# Patient Record
Sex: FEMALE | Race: AMERICAN INDIAN OR ALASKA NATIVE
[De-identification: names, ages, dates, MRNs, and addresses within clinical notes are randomized per-mention and may not be internally consistent; named-entity substitution may affect disease eponyms.]

---

## 2020-02-29 ENCOUNTER — HOSPITAL ENCOUNTER (EMERGENCY)
Dept: HOSPITAL 46 - ED | Age: 50
Discharge: HOME | End: 2020-02-29
Payer: COMMERCIAL

## 2020-02-29 VITALS — WEIGHT: 214.99 LBS | BODY MASS INDEX: 35.82 KG/M2 | HEIGHT: 65 IN

## 2020-02-29 DIAGNOSIS — R07.9: Primary | ICD-10-CM

## 2020-02-29 DIAGNOSIS — E11.9: ICD-10-CM

## 2020-02-29 DIAGNOSIS — Z79.84: ICD-10-CM

## 2020-02-29 NOTE — EKG
[facility]
                                    [address] [facility]
                                  [place]  [address]
_________________________________________________________________________________________
                                                                 Signed   
 
 
Normal sinus rhythm
Normal ECG
No previous ECGs available
Confirmed by YOGI CASTORENA MD ([id number]) on 2/29/2020 4:37:49 PM
 
 
 
 
 
 
 
 
 
 
 
 
 
 
 
 
 
 
 
 
 
 
 
 
 
 
 
 
 
 
 
 
 
 
 
 
 
 
 
 
 
    Electronically Signed By: YOGI CASTORENA MD  02/29/20 1638
_________________________________________________________________________________________
PATIENT NAME:     TIMUR SCHWARTZ                
MEDICAL RECORD #: [id number]                     Electrocardiogram             
          ACCT #: [id number]  
DATE OF BIRTH:   [birth year]                                       
PHYSICIAN:   YOGI CASTORENA MD           REPORT #: [id number]
REPORT IS CONFIDENTIAL AND NOT TO BE RELEASED WITHOUT AUTHORIZATION

## 2021-03-05 ENCOUNTER — HOSPITAL ENCOUNTER (EMERGENCY)
Dept: HOSPITAL 46 - ED | Age: 51
Discharge: HOME | End: 2021-03-05
Payer: COMMERCIAL

## 2021-03-05 VITALS — WEIGHT: 215 LBS | BODY MASS INDEX: 35.82 KG/M2 | HEIGHT: 65 IN

## 2021-03-05 DIAGNOSIS — E11.9: ICD-10-CM

## 2021-03-05 DIAGNOSIS — N93.9: Primary | ICD-10-CM

## 2021-03-05 DIAGNOSIS — Z79.4: ICD-10-CM

## 2021-03-05 DIAGNOSIS — Z79.899: ICD-10-CM

## 2023-07-24 ENCOUNTER — HOSPITAL ENCOUNTER (OUTPATIENT)
Dept: HOSPITAL 46 - OPS | Age: 53
Discharge: HOME | End: 2023-07-24
Attending: SURGERY
Payer: COMMERCIAL

## 2023-07-24 VITALS — SYSTOLIC BLOOD PRESSURE: 136 MMHG | DIASTOLIC BLOOD PRESSURE: 79 MMHG

## 2023-07-24 VITALS — DIASTOLIC BLOOD PRESSURE: 70 MMHG | SYSTOLIC BLOOD PRESSURE: 138 MMHG

## 2023-07-24 DIAGNOSIS — Z12.11: Primary | ICD-10-CM

## 2023-07-24 DIAGNOSIS — K64.8: ICD-10-CM

## 2023-07-24 DIAGNOSIS — Z90.49: ICD-10-CM

## 2023-07-24 DIAGNOSIS — Z86.010: ICD-10-CM

## 2023-07-24 DIAGNOSIS — E11.9: ICD-10-CM

## 2023-07-24 DIAGNOSIS — E66.9: ICD-10-CM

## 2023-07-24 DIAGNOSIS — Z79.4: ICD-10-CM

## 2023-07-24 DIAGNOSIS — K63.5: ICD-10-CM

## 2023-07-24 DIAGNOSIS — Z79.84: ICD-10-CM

## 2023-07-24 PROCEDURE — 0DBN8ZX EXCISION OF SIGMOID COLON, VIA NATURAL OR ARTIFICIAL OPENING ENDOSCOPIC, DIAGNOSTIC: ICD-10-PCS | Performed by: SURGERY

## 2023-07-24 PROCEDURE — G0500 MOD SEDAT ENDO SERVICE >5YRS: HCPCS

## 2023-07-24 NOTE — OR
[facility]
                                    [address] [place], [place]  [address]
_________________________________________________________________________________________
                                                                 Draft    
 
 
DATE OF OPERATION:
07/24/2023
 
SURGEON:
Roxanne Gonzales MD
 
PREOPERATIVE DIAGNOSIS:
Colon screening, episodic rectal bleeding.
 
POSTOPERATIVE DIAGNOSIS:
Two small polyps of the rectosigmoid (excised) and internal hemorrhoids.
 
PROCEDURE:
Total colonoscopy to the cecum with cold morcellation polypectomy x2.
 
ANESTHESIA:
Intravenous sedation; fentanyl 100 mcg and Versed 5 mg.
 
INDICATION:
This 53-year-old woman is a patient of Dr. Barrow at [facility] and referred for
colon screening.  She has episodic rectal bleeding, none in recent times.  She has had
colonoscopy in 2000 and has no family history of colon cancer that she is aware of.  She
understands the risk of bleeding, infection and perforation related to colonoscopy and
wished to proceed. 
 
FINDINGS:
The prep was excellent.  Complete colonoscopy was undertaken to the cecum without
question.  She had two very small polyps of the rectosigmoid, both excised.  Retroflexed
view confirmed internal hemorrhoidal changes.  She had no sign of active bleeding at
this time. 
 
DESCRIPTION OF PROCEDURE:
The patient was brought to the endoscopy suite and placed in the lateral decubitus
position, given intravenous sedation to the point of slurred speech and nystagmus.
Digital rectal examination was normal. 
 
An Olympus video colonoscope was passed in the rectum and manipulated throughout the
colon ultimately intubating the cecum itself.  The ileocecal valve and the appendiceal
orifice were normal.  The scope was withdrawn from that point and examination throughout
undertaken ultimately showing two very small polyps in the rectosigmoid, both were
excised with cold morcellation technique.  Retroflexed view was undertaken showing
internal hemorrhoidal change.  The hemorrhoids were not bleeding.  The scope was
 
                                                                                    
_________________________________________________________________________________________
PATIENT NAME:     TIMUR SCHWARTZ                
MEDICAL RECORD #: [id number]            OPERATIVE REPORT              
          ACCT #: [id number]  
DATE OF BIRTH:   [birth year]            REPORT #: [id number]      
PHYSICIAN:        ROXANNE GONZALES MD                 
PCP:              AVA BARROW MD                
REPORT IS CONFIDENTIAL AND NOT TO BE RELEASED WITHOUT AUTHORIZATION
 
 
                                  [facility]
                                    [address] [place], [place]  [address]
_________________________________________________________________________________________
                                                                 Draft    
 
 
withdrawn and removed. The patient was taken to the recovery room in good condition. 
 
CONCLUDING DIAGNOSES:
1. Two small polyps of the rectosigmoid (excised).
2. Internal hemorrhoids.
 
PLAN:
Recommend repeat colonoscopy in 3 to 5 years, sooner if clinically indicated.  Would
recommend high-fiber diet.  If recurrent rectal bleeding should recur, consideration for
internal hemorrhoidal banding would be undertaken.  She will return to the ongoing care
of Dr. Barrow at [facility]. 
 
 
 
            ________________________________________
            MD PATRICIO Omalley/MODL
Job #:  [id number]
DD:  07/24/2023 08:10:53
DT:  07/24/2023 09:00:58
 
cc:            Ava Barrow MD
 
 
Copies:  AVA BARROW MD
~
 
 
 
 
 
 
 
 
 
 
 
 
 
 
 
                                                                                    
_________________________________________________________________________________________
PATIENT NAME:     TIMUR SCHWARTZ                
MEDICAL RECORD #: [id number]            OPERATIVE REPORT              
          ACCT #: [id number]  
DATE OF BIRTH:   [birth year]            REPORT #: [id number]      
PHYSICIAN:        ROAXNNE GONZAELS MD                 
PCP:              AAV BARROW MD                
REPORT IS CONFIDENTIAL AND NOT TO BE RELEASED WITHOUT AUTHORIZATION

## 2023-07-24 NOTE — NUR
07/24/23 0806 Shakira Cotter
0802-PT TO PACU IN LL POSITION. EYES CLOSED. PT RESPONDS TO VERBAL AND
TACTILE STIMULI. DENIES PAIN AND FALLS QUICKLY BACK TO SLEEP WITHOUT
RN STIMULI. BREATHING EASY AND UNLABORED. SPO2 >90% ON ROOM AIR. PT
EDUCATED TO POC IN PACU AND ENCOURAGED TO PASS GAS.

## 2023-07-26 NOTE — PATH
[facility]
                                    [address] [place], [place]  [address]
_________________________________________________________________________________________
                                                                 Signed   
 
 
 
SPECIMEN(S): A RECTOSIGMOID POLYP
 
SPECIMEN SOURCE:
A. RECTOSIGMOID POLYP
 
CLINICAL HISTORY:
Colonoscopy
 
FINAL PATHOLOGIC DIAGNOSIS:
Rectosigmoid polyp, polypectomy:
-  Hyperplastic polyp.
-  Negative for dysplasia.
NA:cml:C2NR
 
MICROSCOPIC EXAMINATION:
Histologic sections of all submitted blocks are examined by light microscopy.  
These findings, together with the gross examination, support the pathologic 
diagnosis. 
 
GROSS DESCRIPTION:
The specimen, labeled and designated "LIZBET Tolliver," and designated on the 
requisition "colon, rectosigmoid polyp," is received in formalin and consists 
of two tan soft polypoid tissue fragments 
measuring 0.4 to 0.5 cm in length and up to 0.2 cm in diameter. The specimens 
are submitted entirely in (A1). 
MMA (under the direct supervision of a pathologist)
The Gross Description was prepared using a voice recognition system. The report 
was reviewed for accuracy; however, sound-alike word errors, addition and/or 
deletions may occur. If there is any 
question about this report, please contact Client Services.
 
PERFORMING LABORATORY:
Technical component was performed by [facility], [address] [place] [id number] (CLIA# [id number]).  Professional interpretation was 
performed by [facility], [address] [place] [address] (CLIA# [id number]).
 
Diagnostician:  Tiffanie Abel MD
Pathologist
Electronically Signed 07/26/2023
 
 
                                                                                    
_________________________________________________________________________________________
PATIENT NAME:     TIMUR TOLLIVER                
MEDICAL RECORD #: [id number]            PATHOLOGY                     
          ACCT #: [id number]       ACCESSION #: [id number]     
DATE OF BIRTH:   [birth year]            REPORT #: [id number]       
PHYSICIAN:        [facility]              
PCP:              AVA PEACOCK MD                
REPORT IS CONFIDENTIAL AND NOT TO BE RELEASED WITHOUT AUTHORIZATION
 
 
                                  [address]
                                  [place], [place]  [address]
_________________________________________________________________________________________
                                                                 Signed   
 
 
Copies:                                
~
 
 
 
 
 
 
 
 
 
 
 
 
 
 
 
 
 
 
 
 
 
 
 
 
 
 
 
 
 
 
 
 
 
 
 
 
 
 
 
 
 
                                                                                    
_________________________________________________________________________________________
PATIENT NAME:     TIMUR TOLLIVER                
MEDICAL RECORD #: [id number]            PATHOLOGY                     
          ACCT #: [id number]       ACCESSION #: [id number]     
DATE OF BIRTH:   [birth year]            REPORT #: [id number]       
PHYSICIAN:        [facility]              
PCP:              AVA PEACOCK MD                
REPORT IS CONFIDENTIAL AND NOT TO BE RELEASED WITHOUT AUTHORIZATION

## 2024-09-08 ENCOUNTER — HOSPITAL ENCOUNTER (EMERGENCY)
Dept: HOSPITAL 46 - ED | Age: 54
Discharge: HOME | End: 2024-09-08
Payer: COMMERCIAL

## 2024-09-08 VITALS — WEIGHT: 201.28 LBS | HEIGHT: 65 IN | BODY MASS INDEX: 33.54 KG/M2

## 2024-09-08 VITALS — DIASTOLIC BLOOD PRESSURE: 81 MMHG | SYSTOLIC BLOOD PRESSURE: 120 MMHG

## 2024-09-08 DIAGNOSIS — Z79.84: ICD-10-CM

## 2024-09-08 DIAGNOSIS — E11.9: ICD-10-CM

## 2024-09-08 DIAGNOSIS — R21: Primary | ICD-10-CM

## 2024-09-08 DIAGNOSIS — Z79.85: ICD-10-CM

## 2024-09-08 DIAGNOSIS — L29.9: ICD-10-CM

## 2024-09-08 LAB
ALBUMIN SERPL-MCNC: 3.6 G/DL (ref 3.4–5)
ALBUMIN/GLOB SERPL: 1.06 {RATIO} (ref 1.1–2.4)
ALP SERPL-CCNC: 108 U/L (ref 46–116)
ALT SERPL W P-5'-P-CCNC: 41 U/L (ref 14–59)
ANION GAP SERPL CALCULATED.4IONS-SCNC: 14.5 MMOL/L (ref 7–21)
AST SERPL-CCNC: 36 U/L (ref 15–37)
BASOPHILS NFR BLD AUTO: 0.6 % (ref 0–2)
BUN SERPL-MCNC: 21 MG/DL (ref 7–18)
BUN/CREAT SERPL: 20.58 (ref 6–28.6)
CALCIUM SERPL-MCNC: 9.4 MG/DL (ref 8.5–10.1)
CHLORIDE SERPL-SCNC: 107 MMOL/L (ref 98–107)
CO2 SERPL-SCNC: 25 MMOL/L (ref 21–32)
DEPRECATED RDW RBC AUTO: 16.4 FL (ref 10.5–15)
EGFRCR SERPLBLD CKD-EPI 2021: 65 ML/MIN (ref 60–?)
EOSINOPHIL NFR BLD AUTO: 2.7 % (ref 0–6)
GLOBULIN SER-MCNC: 3.4 G/DL (ref 1.8–3.5)
HCT VFR BLD AUTO: 38.1 % (ref 35–50)
HGB BLD-MCNC: 12.6 G/DL (ref 12–18)
LYMPHOCYTES NFR BLD AUTO: 29.8 % (ref 24–44)
MCH RBC QN AUTO: 28.3 PG (ref 27–36)
MCHC RBC AUTO-ENTMCNC: 33 G/DL (ref 30–36)
MCV RBC AUTO: 85.6 FL (ref 81–99)
MONOCYTES NFR BLD AUTO: 5 % (ref 0–12)
NEUTROPHILS NFR BLD AUTO: 61.9 % (ref 39–80)
PLATELET # BLD AUTO: 246 K/UL (ref 140–440)
POTASSIUM SERPL-SCNC: 3.5 MMOL/L (ref 3.5–5.1)
PROT SERPL-MCNC: 7 G/DL (ref 6.4–8.2)
RBC # BLD AUTO: 4.45 M/UL (ref 4.3–5.7)